# Patient Record
Sex: MALE | Race: BLACK OR AFRICAN AMERICAN | HISPANIC OR LATINO | URBAN - METROPOLITAN AREA
[De-identification: names, ages, dates, MRNs, and addresses within clinical notes are randomized per-mention and may not be internally consistent; named-entity substitution may affect disease eponyms.]

---

## 2023-12-26 ENCOUNTER — OFFICE VISIT (OUTPATIENT)
Age: 43
End: 2023-12-26
Payer: OTHER GOVERNMENT

## 2023-12-26 VITALS
TEMPERATURE: 97.9 F | OXYGEN SATURATION: 98 % | SYSTOLIC BLOOD PRESSURE: 122 MMHG | WEIGHT: 168 LBS | DIASTOLIC BLOOD PRESSURE: 78 MMHG | RESPIRATION RATE: 18 BRPM | HEART RATE: 70 BPM

## 2023-12-26 DIAGNOSIS — M54.42 ACUTE LEFT-SIDED LOW BACK PAIN WITH LEFT-SIDED SCIATICA: Primary | ICD-10-CM

## 2023-12-26 PROBLEM — H91.91 HEARING LOSS OF RIGHT EAR: Status: ACTIVE | Noted: 2021-03-05

## 2023-12-26 PROBLEM — M19.90 INFLAMMATORY ARTHRITIS: Status: ACTIVE | Noted: 2017-02-21

## 2023-12-26 PROBLEM — N52.9 ERECTILE DYSFUNCTION: Status: ACTIVE | Noted: 2023-05-17

## 2023-12-26 PROBLEM — R79.82 CRP ELEVATED: Status: ACTIVE | Noted: 2017-02-21

## 2023-12-26 PROBLEM — M25.50 JOINT PAIN: Status: ACTIVE | Noted: 2017-02-21

## 2023-12-26 PROBLEM — J33.9 NASAL POLYPS: Status: ACTIVE | Noted: 2021-05-19

## 2023-12-26 PROBLEM — R74.8 ELEVATED CK: Status: ACTIVE | Noted: 2017-02-21

## 2023-12-26 PROBLEM — J34.3 HYPERTROPHY OF BOTH INFERIOR NASAL TURBINATES: Status: ACTIVE | Noted: 2021-03-05

## 2023-12-26 PROBLEM — J34.2 NASAL SEPTAL DEVIATION: Status: ACTIVE | Noted: 2021-05-19

## 2023-12-26 PROBLEM — H81.01 MENIERE'S DISEASE OF RIGHT EAR: Status: ACTIVE | Noted: 2021-05-19

## 2023-12-26 PROBLEM — H93.13 TINNITUS OF BOTH EARS: Status: ACTIVE | Noted: 2021-03-05

## 2023-12-26 PROBLEM — J30.9 ALLERGIC RHINITIS: Status: ACTIVE | Noted: 2021-03-05

## 2023-12-26 PROBLEM — R19.4 CHANGE IN BOWEL HABITS: Status: ACTIVE | Noted: 2023-12-26

## 2023-12-26 PROBLEM — H81.02 ENDOLYMPHATIC HYDROPS OF LEFT EAR: Status: ACTIVE | Noted: 2021-03-05

## 2023-12-26 PROBLEM — H54.7 DECREASED VISION: Status: ACTIVE | Noted: 2023-05-17

## 2023-12-26 PROBLEM — R21 RASH: Status: ACTIVE | Noted: 2023-05-17

## 2023-12-26 PROBLEM — M25.519 SHOULDER PAIN: Status: ACTIVE | Noted: 2023-05-17

## 2023-12-26 PROBLEM — J32.9 SINUSITIS: Status: ACTIVE | Noted: 2021-05-19

## 2023-12-26 PROBLEM — Z87.19 HISTORY OF COLITIS: Status: ACTIVE | Noted: 2023-12-26

## 2023-12-26 PROBLEM — R10.84 GENERALIZED ABDOMINAL PAIN: Status: ACTIVE | Noted: 2023-12-26

## 2023-12-26 PROBLEM — H69.91 DYSFUNCTION OF RIGHT EUSTACHIAN TUBE: Status: ACTIVE | Noted: 2021-03-05

## 2023-12-26 PROBLEM — G47.00 INSOMNIA: Status: ACTIVE | Noted: 2023-05-17

## 2023-12-26 PROBLEM — R53.83 FATIGUE: Status: ACTIVE | Noted: 2017-02-21

## 2023-12-26 PROBLEM — Z86.19 HISTORY OF HELICOBACTER INFECTION: Status: ACTIVE | Noted: 2023-12-26

## 2023-12-26 PROBLEM — B35.3 TINEA PEDIS: Status: ACTIVE | Noted: 2023-05-17

## 2023-12-26 PROCEDURE — G0463 HOSPITAL OUTPT CLINIC VISIT: HCPCS

## 2023-12-26 PROCEDURE — 99213 OFFICE O/P EST LOW 20 MIN: CPT

## 2023-12-26 RX ORDER — METHYLPREDNISOLONE 4 MG/1
TABLET ORAL
Qty: 21 EACH | Refills: 0 | Status: SHIPPED | OUTPATIENT
Start: 2023-12-26

## 2023-12-26 RX ORDER — METHOCARBAMOL 500 MG/1
500 TABLET, FILM COATED ORAL 3 TIMES DAILY
Qty: 21 TABLET | Refills: 0 | Status: SHIPPED | OUTPATIENT
Start: 2023-12-26 | End: 2024-01-02

## 2023-12-26 NOTE — PATIENT INSTRUCTIONS
Take steroid pack and use muscle relaxer as directed for next week. May take tylenol and apply topical lidocaine patches (12 hours on, 12 hours off) for additional relief of symptoms. Do not take muscle relaxer during work hours or with any other sedating medications. May apply heat/ice every 3-4 hours for 20 minutes at a time. Follow-up with PCP in 3-5 days if no improvement of symptoms. Report to the ER sooner if symptoms worsen or develop sudden loss of bowel/bladder function with groin numbness.

## 2023-12-26 NOTE — LETTER
December 26, 2023     Patient: Dieter Hernández   YOB: 1980   Date of Visit: 12/26/2023       To Whom it May Concern:    Dieter Hernández was seen in my clinic on 12/26/2023. He may return to work on 12/28/2023 .    If you have any questions or concerns, please don't hesitate to call.         Sincerely,          DAYLIN Martin        CC: No Recipients

## 2023-12-26 NOTE — PROGRESS NOTES
[facility]        NAME: Dieter Hernández is a 43 y.o. male  : 1980    MRN: [id number]  DATE: 2023  TIME: 5:18 PM    Assessment and Plan   Acute left-sided low back pain with left-sided sciatica [M54.42]  1. Acute left-sided low back pain with left-sided sciatica  methylPREDNISolone 4 MG tablet therapy pack    methocarbamol (ROBAXIN) 500 mg tablet        Physical exam consistent with sciatica, medications as instructed. Patient declined PT referral at this time. Educated on OTC products for additional relief of symptoms. Work note provided.    Patient Instructions     Take steroid pack and use muscle relaxer as directed for next week. May take tylenol and apply topical lidocaine patches (12 hours on, 12 hours off) for additional relief of symptoms. Do not take muscle relaxer during work hours or with any other sedating medications. May apply heat/ice every 3-4 hours for 20 minutes at a time. Follow-up with PCP in 3-5 days if no improvement of symptoms. Report to the ER sooner if symptoms worsen or develop sudden loss of bowel/bladder function with groin numbness.      Chief Complaint     Chief Complaint   Patient presents with    Back Pain     Pt states for 2 days he has tightness in his back prohibiting him from being able to bend     Cold Like Symptoms     Pt states he is having allergies with no relief from OTC medications         History of Present Illness       43 year old male presents for evaluation of left sided low back pain that started 2 days ago. He denies any known injury but reports he does do a lot of standing for work. He denies prior back injury/surgeries. He denies associated paresthesias or loss of bladder/bowel function. He has tried ibuprofen for symptoms with minimal improvement of symptoms.     Back Pain  This is a new problem. The current episode started in the past 7 days. The problem occurs constantly. The problem is unchanged. The pain is present in the  lumbar spine. The quality of the pain is described as aching. The pain radiates to the left thigh. The pain is at a severity of 8/10. The pain is moderate. The pain is The same all the time. The symptoms are aggravated by bending, twisting and standing. Associated symptoms include leg pain. Pertinent negatives include no abdominal pain, bladder incontinence, bowel incontinence, chest pain, dysuria, fever, headaches, numbness, paresis, paresthesias, pelvic pain, perianal numbness, tingling, weakness or weight loss. He has tried NSAIDs for the symptoms. The treatment provided no relief.       Review of Systems   Review of Systems   Constitutional:  Negative for activity change, appetite change, chills, fatigue, fever and weight loss.   Respiratory:  Negative for cough, chest tightness and shortness of breath.    Cardiovascular:  Negative for chest pain.   Gastrointestinal:  Negative for abdominal pain and bowel incontinence.   Genitourinary:  Negative for bladder incontinence, dysuria and pelvic pain.   Musculoskeletal:  Positive for back pain. Negative for arthralgias, myalgias and neck pain.   Skin:  Negative for color change and pallor.   Allergic/Immunologic: Negative for environmental allergies and food allergies.   Neurological:  Negative for dizziness, tingling, weakness, light-headedness, numbness, headaches and paresthesias.         Current Medications       Current Outpatient Medications:     methocarbamol (ROBAXIN) 500 mg tablet, Take 1 tablet (500 mg total) by mouth 3 (three) times a day for 7 days, Disp: 21 tablet, Rfl: 0    methylPREDNISolone 4 MG tablet therapy pack, Use as directed on package, Disp: 21 each, Rfl: 0    Current Allergies     Allergies as of 12/26/2023    (No Known Allergies)            The following portions of the patient's history were reviewed and updated as appropriate: allergies, current medications, past family history, past medical history, past social history, past surgical  history and problem list.     History reviewed. No pertinent past medical history.    History reviewed. No pertinent surgical history.    History reviewed. No pertinent family history.      Medications have been verified.        Objective   /78   Pulse 70   Temp 97.9 °F (36.6 °C)   Resp 18   Wt 76.2 kg (168 lb)   SpO2 98%        Physical Exam     Physical Exam  Vitals and nursing note reviewed.   Constitutional:       General: He is awake. He is not in acute distress.     Appearance: Normal appearance. He is well-developed and normal weight.   HENT:      Head: Normocephalic and atraumatic.   Cardiovascular:      Rate and Rhythm: Normal rate and regular rhythm.      Pulses: Normal pulses.      Heart sounds: Normal heart sounds.   Pulmonary:      Effort: Pulmonary effort is normal.      Breath sounds: Normal breath sounds.   Musculoskeletal:         General: Tenderness present. No swelling, deformity or signs of injury.      Cervical back: Normal, normal range of motion and neck supple.      Thoracic back: Normal.      Lumbar back: Spasms and tenderness present. No bony tenderness. Positive left straight leg raise test. Negative right straight leg raise test.        Back:    Skin:     General: Skin is warm and dry.      Findings: No abrasion, bruising, ecchymosis, rash or wound.   Neurological:      General: No focal deficit present.      Mental Status: He is alert and oriented to person, place, and time.   Psychiatric:         Mood and Affect: Mood normal.         Behavior: Behavior normal. Behavior is cooperative.         Thought Content: Thought content normal.         Judgment: Judgment normal.

## 2024-02-21 PROBLEM — J32.9 SINUSITIS: Status: RESOLVED | Noted: 2021-05-19 | Resolved: 2024-02-21

## 2025-08-05 ENCOUNTER — TELEPHONE (OUTPATIENT)
Age: 45
End: 2025-08-05